# Patient Record
Sex: MALE | Race: WHITE | Employment: FULL TIME | URBAN - METROPOLITAN AREA
[De-identification: names, ages, dates, MRNs, and addresses within clinical notes are randomized per-mention and may not be internally consistent; named-entity substitution may affect disease eponyms.]

---

## 2022-08-13 ENCOUNTER — APPOINTMENT (OUTPATIENT)
Dept: GENERAL RADIOLOGY | Age: 27
End: 2022-08-13
Payer: COMMERCIAL

## 2022-08-13 ENCOUNTER — HOSPITAL ENCOUNTER (EMERGENCY)
Age: 27
Discharge: HOME | End: 2022-08-13
Attending: EMERGENCY MEDICINE
Payer: COMMERCIAL

## 2022-08-13 VITALS
BODY MASS INDEX: 22.63 KG/M2 | SYSTOLIC BLOOD PRESSURE: 127 MMHG | HEART RATE: 97 BPM | HEIGHT: 75 IN | RESPIRATION RATE: 16 BRPM | OXYGEN SATURATION: 98 % | DIASTOLIC BLOOD PRESSURE: 78 MMHG | WEIGHT: 182 LBS | TEMPERATURE: 99.6 F

## 2022-08-13 DIAGNOSIS — S82.832A OTHER CLOSED FRACTURE OF DISTAL END OF LEFT FIBULA, INITIAL ENCOUNTER: Primary | ICD-10-CM

## 2022-08-13 PROCEDURE — 29515 APPLICATION SHORT LEG SPLINT: CPT

## 2022-08-13 PROCEDURE — 73610 X-RAY EXAM OF ANKLE: CPT

## 2022-08-13 PROCEDURE — 6370000000 HC RX 637 (ALT 250 FOR IP): Performed by: EMERGENCY MEDICINE

## 2022-08-13 PROCEDURE — 99283 EMERGENCY DEPT VISIT LOW MDM: CPT

## 2022-08-13 RX ORDER — LAMOTRIGINE 25 MG/1
25 TABLET ORAL DAILY
COMMUNITY

## 2022-08-13 RX ORDER — HYDROCODONE BITARTRATE AND ACETAMINOPHEN 5; 325 MG/1; MG/1
1 TABLET ORAL EVERY 4 HOURS PRN
Qty: 18 TABLET | Refills: 0 | Status: SHIPPED | OUTPATIENT
Start: 2022-08-13 | End: 2022-08-16

## 2022-08-13 RX ORDER — ESOMEPRAZOLE MAGNESIUM 40 MG/1
40 CAPSULE, DELAYED RELEASE ORAL
COMMUNITY
Start: 2022-07-19 | End: 2023-07-14

## 2022-08-13 RX ORDER — OXYCODONE HYDROCHLORIDE AND ACETAMINOPHEN 5; 325 MG/1; MG/1
1 TABLET ORAL ONCE
Status: COMPLETED | OUTPATIENT
Start: 2022-08-13 | End: 2022-08-13

## 2022-08-13 RX ORDER — BUPROPION HYDROCHLORIDE 150 MG/1
150 TABLET ORAL
COMMUNITY
Start: 2022-07-19 | End: 2022-08-18

## 2022-08-13 RX ORDER — FLUTICASONE PROPIONATE AND SALMETEROL 100; 50 UG/1; UG/1
1 POWDER RESPIRATORY (INHALATION) 2 TIMES DAILY PRN
COMMUNITY
Start: 2022-01-19

## 2022-08-13 RX ADMIN — OXYCODONE AND ACETAMINOPHEN 1 TABLET: 5; 325 TABLET ORAL at 21:35

## 2022-08-13 ASSESSMENT — PAIN SCALES - GENERAL
PAINLEVEL_OUTOF10: 2
PAINLEVEL_OUTOF10: 5
PAINLEVEL_OUTOF10: 4

## 2022-08-13 ASSESSMENT — PAIN DESCRIPTION - ORIENTATION
ORIENTATION: LEFT

## 2022-08-13 ASSESSMENT — PAIN DESCRIPTION - FREQUENCY
FREQUENCY: CONTINUOUS
FREQUENCY: CONTINUOUS

## 2022-08-13 ASSESSMENT — PAIN - FUNCTIONAL ASSESSMENT
PAIN_FUNCTIONAL_ASSESSMENT: PREVENTS OR INTERFERES SOME ACTIVE ACTIVITIES AND ADLS
PAIN_FUNCTIONAL_ASSESSMENT: 0-10
PAIN_FUNCTIONAL_ASSESSMENT: 0-10

## 2022-08-13 ASSESSMENT — PAIN DESCRIPTION - PAIN TYPE
TYPE: ACUTE PAIN
TYPE: ACUTE PAIN

## 2022-08-13 ASSESSMENT — PAIN DESCRIPTION - DESCRIPTORS
DESCRIPTORS: ACHING
DESCRIPTORS: ACHING

## 2022-08-13 ASSESSMENT — PAIN DESCRIPTION - LOCATION
LOCATION: ANKLE

## 2022-08-13 ASSESSMENT — PAIN DESCRIPTION - ONSET: ONSET: ON-GOING

## 2022-08-13 NOTE — Clinical Note
Neto Saha was seen and treated in our emergency department on 8/13/2022. He may return to work on 08/18/2022. Restrictions as directed by the orthopedic doctor     If you have any questions or concerns, please don't hesitate to call.       Jinny Manuel, DO
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
## 2022-08-14 NOTE — DISCHARGE INSTRUCTIONS
Do not place any weight on this leg. You must use a walker or crutches to ambulate at all times. Continue this therapy until further instructions from your healthcare provider with whom you follow up. You have been administered medications in the emergency department that may cause drowsiness. Do not be driving, operating heavy machinery, swimming, caring for small children, or engaging in dangerous activities of any type until these medications have worn off. This may be as long as 6-8 hours. You have been prescribed medications that may cause drowsiness. Do not be driving, operating heavy machinery, swimming, caring for small children, or engaging in dangerous activities of any type until these medications have worn off. This may be as long as 6-8 hours. Do not take Tylenol (acetaminophen) with these medications. Tylenol is a component of this medication and this may cause an overdose of acetaminophen. Do not take Motrin (ibuprofen) or Naprosyn (naproxen) for pain because this will slow the healing of broken bones.
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
## 2022-08-15 ENCOUNTER — TELEPHONE (OUTPATIENT)
Dept: ORTHOPEDIC SURGERY | Age: 27
End: 2022-08-15

## 2022-08-15 NOTE — TELEPHONE ENCOUNTER
LVM for patient to al back if interested in scheduling an appointment with Dr Kay Kat.  Please offer Wednesday 8/17 @ 1 PM.

## 2022-08-16 ENCOUNTER — OFFICE VISIT (OUTPATIENT)
Dept: ORTHOPEDIC SURGERY | Age: 27
End: 2022-08-16
Payer: COMMERCIAL

## 2022-08-16 VITALS — RESPIRATION RATE: 18 BRPM | BODY MASS INDEX: 22.63 KG/M2 | WEIGHT: 182 LBS | HEIGHT: 75 IN

## 2022-08-16 DIAGNOSIS — S82.62XA CLOSED DISPLACED FRACTURE OF LATERAL MALLEOLUS OF LEFT FIBULA, INITIAL ENCOUNTER: ICD-10-CM

## 2022-08-16 DIAGNOSIS — S99.912A LEFT ANKLE INJURY, INITIAL ENCOUNTER: Primary | ICD-10-CM

## 2022-08-16 PROCEDURE — 99203 OFFICE O/P NEW LOW 30 MIN: CPT | Performed by: PHYSICIAN ASSISTANT

## 2022-08-16 PROCEDURE — L4361 PNEUMA/VAC WALK BOOT PRE OTS: HCPCS | Performed by: PHYSICIAN ASSISTANT
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
## 2022-08-17 ENCOUNTER — TELEPHONE (OUTPATIENT)
Dept: ORTHOPEDIC SURGERY | Age: 27
End: 2022-08-17

## 2022-08-17 NOTE — PROGRESS NOTES
Name_______________________________________Printed:____________________  Date and time of surgery_______8/18/22 0800_________________Arrival Time:_____0630 [facility]___________   1. The instructions given regarding when and if a patient needs to stop oral intake prior to surgery varies. Follow the specific instructions you were given                  _x__Nothing to eat or to drink after Midnight the night before.                   ____Carbo loading or ERAS instructions will be given to select patients-if you have been given those instructions -please do the following                           The evening before your surgery after dinner before midnight drink 40 ounces of gatorade. If you are diabetic use sugar free. The morning of surgery drink 40 ounces of water. This needs to be finished 3 hours prior to your surgery start time. 2. Take the following pills with a small sip of water on the morning of surgery___________advair________________________________________                  Do not take blood pressure medications ending in pril or sartan the morro prior to surgery or the morning of surgery_   3. Aspirin, Ibuprofen, Advil, Naproxen, Vitamin E and other Anti-inflammatory products and supplements should be stopped for 5 -7days before surgery or as directed by your physician. 4. Check with your Doctor regarding stopping Plavix, Coumadin,Eliquis, Lovenox,Effient,Pradaxa,Xarelto, Fragmin or other blood thinners and follow their instructions. 5. Do not smoke, and do not drink any alcoholic beverages 24 hours prior to surgery. This includes NA Beer. Refrain from the usage of any recreational drugs. 6. You may brush your teeth and gargle the morning of surgery. DO NOT SWALLOW WATER   7. You MUST make arrangements for a responsible adult to stay on site while you are here and take you home after your surgery. You will not be allowed to leave alone or drive yourself home.   It is strongly suggested someone stay with you the first 24 hrs. Your surgery will be cancelled if you do not have a ride home. 8. A parent/legal guardian must accompany a child scheduled for surgery and plan to stay at the hospital until the child is discharged. Please do not bring other children with you. 9. Please wear simple, loose fitting clothing to the hospital.  Wesley Posey not bring valuables (money, credit cards, checkbooks, etc.) Do not wear any makeup (including no eye makeup) or nail polish on your fingers or toes. 10. DO NOT wear any jewelry or piercings on day of surgery. All body piercing jewelry must be removed. 11. If you have ___dentures, they will be removed before going to the OR; we will provide you a container. If you wear ___contact lenses or ___glasses, they will be removed; please bring a case for them. 12. Please see your family doctor/pediatrician for a history & physical and/or concerning medications. Bring any test results/reports from your physician's office. PCP_________x_________Phone___________H&P Appt. Date________             13 If you  have a Living Will and Durable Power of  for Healthcare, please bring in a copy. 15. Notify your Surgeon if you develop any illness between now and surgery  time, cough, cold, fever, sore throat, nausea, vomiting, etc.  Please notify your surgeon if you experience dizziness, shortness of breath or blurred vision between now & the time of your surgery             15. DO NOT shave your operative site 96 hours prior to surgery. For face & neck surgery, men may use an electric razor 48 hours prior to surgery. 16. Shower the night before or morning of surgery using an antibacterial soap or as you have been instructed. 17. To provide excellent care visitors will be limited to one in the room at any given time. 18.  Please bring picture ID and insurance card.              19.  Visit our web site for additional information:  [email or web address]              20.During flu season no children under the age of 15 are permitted in the hospital for the safety of all patients. 21. If you take a long acting insulin in the evening only  take half of your usual  dose the night  before your procedure              22. If you use a c-pap please bring DOS if staying overnight,             23.For your convenience [address] [place] [address] has a pharmacy on site to fill your prescriptions. 24. If you use oxygen and have a portable tank please bring it  with you the DOS             25. Bring a complete list of all your medications with name and dose include any supplements. 26. Other__________________________________________   *Please call pre admission testing if you any further questions   Saira Malone   [address]    [address]  [phone]   [address] [place] [address]       VISITOR POLICY(subject to change)    Current policy is 2 visitors per patient. No children. A mask is required. Visiting hours are 8a-8p. Overnight visitors will be at the discretion of the nurse. All above information reviewed with patient in person or by phone. Patient verbalizes understanding. All questions and concerns addressed.                                                                                                  Patient/Rep________pt____________                                                                                                                                    PRE OP INSTRUCTIONS

## 2022-08-17 NOTE — TELEPHONE ENCOUNTER
Auth: NPR  Date: 08/18/22 thru 11/17/22  Reference # [id number]  Spoke with: Online  Type of SX: Outpatient  Location: [facility]  CPT: 38244   DX: [phone]  SX area: Lt ankle  Insurance: [employer]

## 2022-08-18 ENCOUNTER — ANESTHESIA (OUTPATIENT)
Dept: OPERATING ROOM | Age: 27
End: 2022-08-18
Payer: COMMERCIAL

## 2022-08-18 ENCOUNTER — APPOINTMENT (OUTPATIENT)
Dept: GENERAL RADIOLOGY | Age: 27
End: 2022-08-18
Attending: ORTHOPAEDIC SURGERY
Payer: COMMERCIAL

## 2022-08-18 ENCOUNTER — ANESTHESIA EVENT (OUTPATIENT)
Dept: OPERATING ROOM | Age: 27
End: 2022-08-18
Payer: COMMERCIAL

## 2022-08-18 ENCOUNTER — HOSPITAL ENCOUNTER (OUTPATIENT)
Age: 27
Setting detail: OUTPATIENT SURGERY
Discharge: HOME | End: 2022-08-18
Attending: ORTHOPAEDIC SURGERY | Admitting: ORTHOPAEDIC SURGERY
Payer: COMMERCIAL

## 2022-08-18 VITALS
HEIGHT: 75 IN | OXYGEN SATURATION: 97 % | WEIGHT: 183 LBS | BODY MASS INDEX: 22.75 KG/M2 | RESPIRATION RATE: 16 BRPM | SYSTOLIC BLOOD PRESSURE: 146 MMHG | TEMPERATURE: 97.5 F | HEART RATE: 99 BPM | DIASTOLIC BLOOD PRESSURE: 103 MMHG

## 2022-08-18 DIAGNOSIS — S82.62XA CLOSED DISPLACED FRACTURE OF LATERAL MALLEOLUS OF LEFT FIBULA, INITIAL ENCOUNTER: Primary | ICD-10-CM

## 2022-08-18 PROCEDURE — 7100000010 HC PHASE II RECOVERY - FIRST 15 MIN: Performed by: ORTHOPAEDIC SURGERY

## 2022-08-18 PROCEDURE — 6360000002 HC RX W HCPCS: Performed by: NURSE ANESTHETIST, CERTIFIED REGISTERED

## 2022-08-18 PROCEDURE — 73620 X-RAY EXAM OF FOOT: CPT

## 2022-08-18 PROCEDURE — 6360000002 HC RX W HCPCS: Performed by: ORTHOPAEDIC SURGERY

## 2022-08-18 PROCEDURE — 2500000003 HC RX 250 WO HCPCS: Performed by: NURSE ANESTHETIST, CERTIFIED REGISTERED

## 2022-08-18 PROCEDURE — 7100000000 HC PACU RECOVERY - FIRST 15 MIN: Performed by: ORTHOPAEDIC SURGERY

## 2022-08-18 PROCEDURE — 3600000004 HC SURGERY LEVEL 4 BASE: Performed by: ORTHOPAEDIC SURGERY

## 2022-08-18 PROCEDURE — 2500000003 HC RX 250 WO HCPCS: Performed by: ORTHOPAEDIC SURGERY

## 2022-08-18 PROCEDURE — 6360000002 HC RX W HCPCS: Performed by: ANESTHESIOLOGY

## 2022-08-18 PROCEDURE — 2720000010 HC SURG SUPPLY STERILE: Performed by: ORTHOPAEDIC SURGERY

## 2022-08-18 PROCEDURE — 2709999900 HC NON-CHARGEABLE SUPPLY: Performed by: ORTHOPAEDIC SURGERY

## 2022-08-18 PROCEDURE — A4217 STERILE WATER/SALINE, 500 ML: HCPCS | Performed by: ORTHOPAEDIC SURGERY

## 2022-08-18 PROCEDURE — C1713 ANCHOR/SCREW BN/BN,TIS/BN: HCPCS | Performed by: ORTHOPAEDIC SURGERY

## 2022-08-18 PROCEDURE — 2580000003 HC RX 258: Performed by: ANESTHESIOLOGY

## 2022-08-18 PROCEDURE — 3700000001 HC ADD 15 MINUTES (ANESTHESIA): Performed by: ORTHOPAEDIC SURGERY

## 2022-08-18 PROCEDURE — 3600000014 HC SURGERY LEVEL 4 ADDTL 15MIN: Performed by: ORTHOPAEDIC SURGERY

## 2022-08-18 PROCEDURE — 27792 TREATMENT OF ANKLE FRACTURE: CPT | Performed by: ORTHOPAEDIC SURGERY

## 2022-08-18 PROCEDURE — 7100000011 HC PHASE II RECOVERY - ADDTL 15 MIN: Performed by: ORTHOPAEDIC SURGERY

## 2022-08-18 PROCEDURE — 6370000000 HC RX 637 (ALT 250 FOR IP)

## 2022-08-18 PROCEDURE — 7100000001 HC PACU RECOVERY - ADDTL 15 MIN: Performed by: ORTHOPAEDIC SURGERY

## 2022-08-18 PROCEDURE — 3700000000 HC ANESTHESIA ATTENDED CARE: Performed by: ORTHOPAEDIC SURGERY

## 2022-08-18 PROCEDURE — 2580000003 HC RX 258: Performed by: ORTHOPAEDIC SURGERY

## 2022-08-18 DEVICE — SCREW BNE L16MM DIA2.7MM HEX HD DIA2.5MM CANC BIODUR 108C: Type: IMPLANTABLE DEVICE | Site: ANKLE | Status: FUNCTIONAL

## 2022-08-18 DEVICE — SCREW BNE L20MM DIA2.7MM HEX HD DIA2.5MM CANC BIODUR 108C: Type: IMPLANTABLE DEVICE | Site: ANKLE | Status: FUNCTIONAL

## 2022-08-18 DEVICE — SCREW BNE L22MM DIA2.7MM CORT ANK FT S STL ST NONCANNULATED: Type: IMPLANTABLE DEVICE | Site: ANKLE | Status: FUNCTIONAL

## 2022-08-18 DEVICE — SCREW BNE L14MM DIA2.7MM HEX HD DIA2.5MM CANC BIODUR 108C: Type: IMPLANTABLE DEVICE | Site: ANKLE | Status: FUNCTIONAL

## 2022-08-18 DEVICE — SCREW BNE L16MM DIA3.5MM HD DIA2.7MM PERIARTC CORT S STL ST: Type: IMPLANTABLE DEVICE | Site: ANKLE | Status: FUNCTIONAL

## 2022-08-18 DEVICE — PLATE BNE L80MM 4 H L LAT DST PERIARTC FIBULAR S STL LOK: Type: IMPLANTABLE DEVICE | Site: ANKLE | Status: FUNCTIONAL

## 2022-08-18 DEVICE — SCREW BNE L14MM DIA3.5MM HD DIA2.7MM CORT PERIARTC S STL ST: Type: IMPLANTABLE DEVICE | Site: ANKLE | Status: FUNCTIONAL

## 2022-08-18 RX ORDER — BUPIVACAINE HYDROCHLORIDE 5 MG/ML
INJECTION, SOLUTION EPIDURAL; INTRACAUDAL
Status: COMPLETED | OUTPATIENT
Start: 2022-08-18 | End: 2022-08-18

## 2022-08-18 RX ORDER — CEPHALEXIN 500 MG/1
500 CAPSULE ORAL 4 TIMES DAILY
Qty: 20 CAPSULE | Refills: 0 | Status: SHIPPED | OUTPATIENT
Start: 2022-08-18 | End: 2022-08-23

## 2022-08-18 RX ORDER — SODIUM CHLORIDE 0.9 % (FLUSH) 0.9 %
5-40 SYRINGE (ML) INJECTION EVERY 12 HOURS SCHEDULED
Status: DISCONTINUED | OUTPATIENT
Start: 2022-08-18 | End: 2022-08-18 | Stop reason: HOSPADM

## 2022-08-18 RX ORDER — SODIUM CHLORIDE 9 MG/ML
INJECTION, SOLUTION INTRAVENOUS PRN
Status: DISCONTINUED | OUTPATIENT
Start: 2022-08-18 | End: 2022-08-18 | Stop reason: HOSPADM

## 2022-08-18 RX ORDER — MIDAZOLAM HYDROCHLORIDE 1 MG/ML
INJECTION INTRAMUSCULAR; INTRAVENOUS PRN
Status: DISCONTINUED | OUTPATIENT
Start: 2022-08-18 | End: 2022-08-18 | Stop reason: SDUPTHER

## 2022-08-18 RX ORDER — HYDROCODONE BITARTRATE AND ACETAMINOPHEN 5; 325 MG/1; MG/1
1 TABLET ORAL ONCE
Status: COMPLETED | OUTPATIENT
Start: 2022-08-18 | End: 2022-08-18

## 2022-08-18 RX ORDER — HYDROMORPHONE HCL 110MG/55ML
0.5 PATIENT CONTROLLED ANALGESIA SYRINGE INTRAVENOUS EVERY 5 MIN PRN
Status: COMPLETED | OUTPATIENT
Start: 2022-08-18 | End: 2022-08-18

## 2022-08-18 RX ORDER — ONDANSETRON 2 MG/ML
4 INJECTION INTRAMUSCULAR; INTRAVENOUS
Status: DISCONTINUED | OUTPATIENT
Start: 2022-08-18 | End: 2022-08-18 | Stop reason: HOSPADM

## 2022-08-18 RX ORDER — SODIUM CHLORIDE 0.9 % (FLUSH) 0.9 %
5-40 SYRINGE (ML) INJECTION PRN
Status: DISCONTINUED | OUTPATIENT
Start: 2022-08-18 | End: 2022-08-18 | Stop reason: HOSPADM

## 2022-08-18 RX ORDER — MEPERIDINE HYDROCHLORIDE 25 MG/ML
12.5 INJECTION INTRAMUSCULAR; INTRAVENOUS; SUBCUTANEOUS EVERY 5 MIN PRN
Status: DISCONTINUED | OUTPATIENT
Start: 2022-08-18 | End: 2022-08-18 | Stop reason: HOSPADM

## 2022-08-18 RX ORDER — ONDANSETRON 2 MG/ML
INJECTION INTRAMUSCULAR; INTRAVENOUS PRN
Status: DISCONTINUED | OUTPATIENT
Start: 2022-08-18 | End: 2022-08-18 | Stop reason: SDUPTHER

## 2022-08-18 RX ORDER — FENTANYL CITRATE 50 UG/ML
INJECTION, SOLUTION INTRAMUSCULAR; INTRAVENOUS PRN
Status: DISCONTINUED | OUTPATIENT
Start: 2022-08-18 | End: 2022-08-18 | Stop reason: SDUPTHER

## 2022-08-18 RX ORDER — HYDROCODONE BITARTRATE AND ACETAMINOPHEN 5; 325 MG/1; MG/1
1 TABLET ORAL EVERY 6 HOURS PRN
Qty: 12 TABLET | Refills: 0 | Status: SHIPPED | OUTPATIENT
Start: 2022-08-18 | End: 2022-08-21

## 2022-08-18 RX ORDER — DEXAMETHASONE SODIUM PHOSPHATE 4 MG/ML
INJECTION, SOLUTION INTRA-ARTICULAR; INTRALESIONAL; INTRAMUSCULAR; INTRAVENOUS; SOFT TISSUE PRN
Status: DISCONTINUED | OUTPATIENT
Start: 2022-08-18 | End: 2022-08-18 | Stop reason: SDUPTHER

## 2022-08-18 RX ORDER — LIDOCAINE HYDROCHLORIDE 20 MG/ML
INJECTION, SOLUTION INFILTRATION; PERINEURAL PRN
Status: DISCONTINUED | OUTPATIENT
Start: 2022-08-18 | End: 2022-08-18 | Stop reason: SDUPTHER

## 2022-08-18 RX ORDER — SODIUM CHLORIDE 9 MG/ML
INJECTION, SOLUTION INTRAVENOUS CONTINUOUS
Status: DISCONTINUED | OUTPATIENT
Start: 2022-08-18 | End: 2022-08-18 | Stop reason: HOSPADM

## 2022-08-18 RX ORDER — HYDROCODONE BITARTRATE AND ACETAMINOPHEN 5; 325 MG/1; MG/1
TABLET ORAL
Status: COMPLETED
Start: 2022-08-18 | End: 2022-08-18

## 2022-08-18 RX ORDER — PROPOFOL 10 MG/ML
INJECTION, EMULSION INTRAVENOUS PRN
Status: DISCONTINUED | OUTPATIENT
Start: 2022-08-18 | End: 2022-08-18 | Stop reason: SDUPTHER

## 2022-08-18 RX ADMIN — SODIUM CHLORIDE: 9 INJECTION, SOLUTION INTRAVENOUS at 07:26

## 2022-08-18 RX ADMIN — FENTANYL CITRATE 50 MCG: 50 INJECTION, SOLUTION INTRAMUSCULAR; INTRAVENOUS at 09:15

## 2022-08-18 RX ADMIN — CEFAZOLIN 2000 MG: 2 INJECTION, POWDER, FOR SOLUTION INTRAMUSCULAR; INTRAVENOUS at 09:10

## 2022-08-18 RX ADMIN — HYDROMORPHONE HYDROCHLORIDE 0.5 MG: 2 INJECTION, SOLUTION INTRAMUSCULAR; INTRAVENOUS; SUBCUTANEOUS at 10:24

## 2022-08-18 RX ADMIN — PROPOFOL 100 MG: 10 INJECTION, EMULSION INTRAVENOUS at 09:33

## 2022-08-18 RX ADMIN — HYDROMORPHONE HYDROCHLORIDE 0.5 MG: 2 INJECTION, SOLUTION INTRAMUSCULAR; INTRAVENOUS; SUBCUTANEOUS at 10:34

## 2022-08-18 RX ADMIN — HYDROCODONE BITARTRATE AND ACETAMINOPHEN 1 TABLET: 5; 325 TABLET ORAL at 10:50

## 2022-08-18 RX ADMIN — LIDOCAINE HYDROCHLORIDE 100 MG: 20 INJECTION, SOLUTION INFILTRATION; PERINEURAL at 09:18

## 2022-08-18 RX ADMIN — PROPOFOL 100 MG: 10 INJECTION, EMULSION INTRAVENOUS at 09:39

## 2022-08-18 RX ADMIN — MIDAZOLAM 2 MG: 1 INJECTION INTRAMUSCULAR; INTRAVENOUS at 09:12

## 2022-08-18 RX ADMIN — PROPOFOL 200 MG: 10 INJECTION, EMULSION INTRAVENOUS at 09:26

## 2022-08-18 RX ADMIN — ONDANSETRON 4 MG: 2 INJECTION INTRAMUSCULAR; INTRAVENOUS at 09:34

## 2022-08-18 RX ADMIN — DEXAMETHASONE SODIUM PHOSPHATE 10 MG: 4 INJECTION, SOLUTION INTRAMUSCULAR; INTRAVENOUS at 09:34

## 2022-08-18 RX ADMIN — FENTANYL CITRATE 50 MCG: 50 INJECTION, SOLUTION INTRAMUSCULAR; INTRAVENOUS at 09:22

## 2022-08-18 RX ADMIN — HYDROMORPHONE HYDROCHLORIDE 0.5 MG: 2 INJECTION, SOLUTION INTRAMUSCULAR; INTRAVENOUS; SUBCUTANEOUS at 10:39

## 2022-08-18 RX ADMIN — HYDROMORPHONE HYDROCHLORIDE 0.5 MG: 2 INJECTION, SOLUTION INTRAMUSCULAR; INTRAVENOUS; SUBCUTANEOUS at 10:29

## 2022-08-18 RX ADMIN — PROPOFOL 200 MG: 10 INJECTION, EMULSION INTRAVENOUS at 09:19

## 2022-08-18 ASSESSMENT — PAIN SCALES - GENERAL
PAINLEVEL_OUTOF10: 7
PAINLEVEL_OUTOF10: 4
PAINLEVEL_OUTOF10: 7
PAINLEVEL_OUTOF10: 4

## 2022-08-18 ASSESSMENT — PAIN DESCRIPTION - FREQUENCY
FREQUENCY: CONTINUOUS

## 2022-08-18 ASSESSMENT — PAIN DESCRIPTION - DESCRIPTORS
DESCRIPTORS: ACHING

## 2022-08-18 ASSESSMENT — PAIN DESCRIPTION - LOCATION
LOCATION: ANKLE

## 2022-08-18 ASSESSMENT — PAIN DESCRIPTION - ORIENTATION
ORIENTATION: LEFT

## 2022-08-18 ASSESSMENT — PAIN DESCRIPTION - PAIN TYPE
TYPE: SURGICAL PAIN

## 2022-08-18 ASSESSMENT — PAIN - FUNCTIONAL ASSESSMENT
PAIN_FUNCTIONAL_ASSESSMENT: PREVENTS OR INTERFERES SOME ACTIVE ACTIVITIES AND ADLS
PAIN_FUNCTIONAL_ASSESSMENT: 0-10

## 2022-08-18 ASSESSMENT — PAIN DESCRIPTION - ONSET
ONSET: ON-GOING

## 2022-08-18 NOTE — DISCHARGE INSTRUCTIONS
Post op instruction:  1- D/C home  2- Dx Left ankle lateral malleolus fracture. 3- NWB left ankle  4- Elevation surgical site, with ice  5- Keep splint dry and clean  6- F/U in 2 weeks. 7- For DVT prophylaxis- Aspirin 325 mg daily     Hermelinda Rios MD, 8/18/2022      ORTHOPEDIC/PODIATRY DISCHARGE INSTRUCTIONS    Follow your surgeons instructions. Make follow-up appointment. Observe operative area for signs of excessive bleeding such as a slow general ooze that saturates the dressing or bright red bleeding. In either case, apply pressure to the area and elevate if possible and call your surgeon right away. Observe the affected extremity for circulation or nerve impairment such as a change in color, numbness, tingling, coldness or increased pain. If any of these symptoms are present call your surgeon. Observe operative site for any signs of infection such as increased pain, redness, fever greater than 101 degrees, swelling, foul odor or drainage. Contact surgeon if any of these symptoms are present. If you become short of breath call your surgeon or go to the nearest emergency room. Remove dressing if directed by surgeon. Leave steristips or sutures or staples in place. You may loosen your ace wrap if it feels too tight, or if you have severe pain, or if it has swelling. Elevate extremity as directed by surgeon. You may shower when directed by surgeon. Use ice pack as directed by surgeon. Do not use heat. Avoid stress to suture line such as pulling, pushing or tugging. Use crutches as directed by surgeon  Take medications as ordered. Take pain medication with food. Do not drive or operate machinery while taking narcotics. Call your surgeon for any questions or problems. ANESTHESIA DISCHARGE INSTRUCTIONS    Wear your seatbelt home. You are under the influence of drugs-do not drink alcohol, drive, operate machinery, make any important decisions or sign any legal documents for 24 hours.   Children should not ride bikes, [place] or play on gym sets for 24 hours after surgery. A responsible adult needs to be with you for 24 hours. You may experience lightheadedness, dizziness, or sleepiness following surgery. Rest at home today- increase activity as tolerated. Progress slowly to a regular diet unless your physician has instructed you otherwise. Drink plenty of water. If persistent nausea and vomiting becomes a problem, call your physician. Coughing, sore throat and muscle aches are other side effects of anesthesia, and should improve with time. Do not drive or operate machinery while taking narcotics.

## 2022-08-18 NOTE — PROGRESS NOTES
Pt awake and alert at this time. Pt on RA, and VSS. Pt denies nausea and pain tolerable, tolerating PO. Skin warm ,  and able to wiggle left toes. Pt meets criteria to be discharged from Phase 1.

## 2022-08-18 NOTE — PROGRESS NOTES
Discharge instructions review with patient and Elissa Epperson. All home medications have been reviewed, pt v/u. Discharge instructions signed. Pt discharged via wheelchair. Pt discharged with all belongings. Pt states he has crutches at home. Ines Glover taking stable pt home.

## 2022-08-18 NOTE — PROGRESS NOTES
Pt arrived from OR to PACU bay 8. Reported received from [address] [place] [address] staff. Pt arousable to voice. Surgical incisions dressings in place to L Ankle. Pt on 6L simple mask with Nasal trumpet, Sinue Tach, and VSS. Will continue to monitor.

## 2022-08-18 NOTE — ANESTHESIA POSTPROCEDURE EVALUATION
Department of Anesthesiology  Postprocedure Note    Patient: Claudio Cooney  MRN: [id number]  YOB: 1995  Date of evaluation: 8/18/2022      Procedure Summary     Date: 08/18/22 Room / Location: [address]    Anesthesia Start: 0915 Anesthesia Stop: 1010    Procedure: OPEN REDUCTION INTERNAL FIXATION LEFT ANKLE DISTAL FIBULA FRACTURE - Fredrica Cheese (Left: Ankle) Diagnosis:       Closed fracture of left ankle, initial encounter      (K15.651U  LEFT DISTAL FIBULA FRACTURE ANKLE FRACTURE)    Surgeons: Christine York MD Responsible Provider: Carmen Alvarado MD    Anesthesia Type: general ASA Status: 2          Anesthesia Type: No value filed.     Jazmin Phase I: Jazmin Score: 10    Jazmin Phase II:        Anesthesia Post Evaluation    Patient location during evaluation: PACU  Patient participation: complete - patient participated  Level of consciousness: awake  Airway patency: patent  Nausea & Vomiting: no vomiting  Complications: no  Cardiovascular status: hemodynamically stable  Respiratory status: acceptable  Hydration status: euvolemic  Multimodal analgesia pain management approach

## 2022-08-18 NOTE — ANESTHESIA PRE PROCEDURE
Department of Anesthesiology  Preprocedure Note       Name:  Bev Casey   Age:  24 y.o.  :  1995                                          MRN:  [id number]         Date:  2022      Surgeon: Nai Loya):  Arthur Mondragon MD    Procedure: Procedure(s):  OPEN REDUCTION INTERNAL FIXATION LEFT ANKLE DISTAL FIBULA FRACTURE - Eddi Session    Medications prior to admission:   Prior to Admission medications    Medication Sig Start Date End Date Taking? Authorizing Provider   Lisdexamfetamine Dimesylate (VYVANSE) 20 MG CAPS Take 20 mg by mouth in the morning. Historical Provider, MD   lamoTRIgine (LAMICTAL) 25 MG tablet Take 25 mg by mouth in the morning. Takes 2 tablets daily. Historical Provider, MD   buPROPion (WELLBUTRIN XL) 150 MG extended release tablet Take 150 mg by mouth 22  Historical Provider, MD   esomeprazole (NEXIUM) 40 MG delayed release capsule Take 40 mg by mouth 22  Historical Provider, MD   fluticasone-salmeterol (ADVIAR) 100-50 MCG/ACT AEPB diskus inhaler Inhale 1 puff into the lungs 2 times daily as needed 22   Historical Provider, MD       Current medications:    No current facility-administered medications for this encounter. Allergies:  No Known Allergies    Problem List:    Patient Active Problem List   Diagnosis Code    Closed displaced fracture of lateral malleolus of left fibula S82. 57XA       Past Medical History:        Diagnosis Date    Anxiety     Bipolar 1 disorder ([facility] [address]        Past Surgical History:        Procedure Laterality Date    COLONOSCOPY      TONSILLECTOMY         Social History:    Social History     Tobacco Use    Smoking status: Former     Packs/day: 1.00     Types: Cigarettes    Smokeless tobacco: Never   Substance Use Topics    Alcohol use:  Yes     Alcohol/week: 42.0 standard drinks     Types: 42 Cans of beer per week     Comment: social                                Counseling given: Not Answered      Vital Signs (Current):   Vitals:    08/17/22 0918   Weight: 180 lb (81.6 kg)   Height: 6' 3\" (1.905 m)                                              BP Readings from Last 3 Encounters:   08/13/22 127/78       NPO Status:                                                                                 BMI:   Wt Readings from Last 3 Encounters:   08/17/22 180 lb (81.6 kg)   08/16/22 182 lb (82.6 kg)   08/13/22 182 lb (82.6 kg)     Body mass index is 22.5 kg/m². CBC: No results found for: WBC, RBC, HGB, HCT, MCV, RDW, PLT    CMP: No results found for: NA, K, CL, CO2, BUN, CREATININE, GFRAA, AGRATIO, LABGLOM, GLUCOSE, GLU, PROT, CALCIUM, BILITOT, ALKPHOS, AST, ALT    POC Tests: No results for input(s): POCGLU, POCNA, POCK, POCCL, POCBUN, POCHEMO, POCHCT in the last 72 hours. Coags: No results found for: PROTIME, INR, APTT    HCG (If Applicable): No results found for: PREGTESTUR, PREGSERUM, HCG, HCGQUANT     ABGs: No results found for: PHART, PO2ART, JLA2TCM, GQG5WAM, BEART, P6CIPKNB     Type & Screen (If Applicable):  No results found for: LABABO, LABRH    Drug/Infectious Status (If Applicable):  No results found for: HIV, HEPCAB    COVID-19 Screening (If Applicable): No results found for: COVID19        Anesthesia Evaluation  Patient summary reviewed and Nursing notes reviewed  Airway: Mallampati: II          Dental:          Pulmonary:   (+) asthma:                            Cardiovascular:  Exercise tolerance: good (>4 METS),                     Neuro/Psych:   (+) psychiatric history:            GI/Hepatic/Renal:             Endo/Other:                     Abdominal:             Vascular:           Other Findings:           Anesthesia Plan      general     ASA 2                                   Tony Germain MD   8/18/2022
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
## 2022-08-31 ENCOUNTER — OFFICE VISIT (OUTPATIENT)
Dept: ORTHOPEDIC SURGERY | Age: 27
End: 2022-08-31

## 2022-08-31 VITALS — WEIGHT: 183 LBS | HEIGHT: 75 IN | BODY MASS INDEX: 22.75 KG/M2

## 2022-08-31 DIAGNOSIS — S82.62XA CLOSED DISPLACED FRACTURE OF LATERAL MALLEOLUS OF LEFT FIBULA, INITIAL ENCOUNTER: Primary | ICD-10-CM

## 2022-08-31 PROCEDURE — APPNB15 APP NON BILLABLE TIME 0-15 MINS: Performed by: NURSE PRACTITIONER

## 2022-08-31 PROCEDURE — 99024 POSTOP FOLLOW-UP VISIT: CPT | Performed by: NURSE PRACTITIONER

## 2022-08-31 NOTE — PROGRESS NOTES
CHIEF COMPLAINT:  Charles Pozo is here today for Subsequent Annual Medicare Wellness Visit.  Increased stomach gas, urinating issues..    Medication verified and med list updated    Refills needed today?  Yes         CHOLESTEROL (mg/dL)   Date Value   06/26/2017 196     HDL (mg/dL)   Date Value   06/26/2017 43 (L)     No components found for: CHOLHDLRATIO  TRIGLYCERIDE (mg/dL)   Date Value   06/26/2017 100     CALCULATED LDL (mg/dL)   Date Value   06/26/2017 133 (H)      Glucose (mg/dL)   Date Value   06/26/2017 136 (H)     PSA, Total   Date Value   06/07/2016 3.27 ng/mL   07/27/2011 6.05 NG/ML (H)         Health Maintenance   Topic Date Due   • Zoster Vaccine  03/27/1999   • Pneumococcal Vaccine Adult (2 of 2 - PCV13) 09/01/2016   • Diabetes Eye Exam  06/03/2017   • Influenza Vaccine (Season Ended) 09/01/2017   • Diabetes A1C  12/26/2017   • Diabetes GFR  06/26/2018   • Diabetes Foot Exam  06/29/2018   • Medicare Wellness 65+  06/29/2018   • DTaP/Tdap/Td Vaccine (2 - Td) 06/23/2026      DIAGNOSIS:  Left ankle lateral malleolus displaced fracture, status post ORIF. DATE OF SURGERY: 8/18/2022. HISTORY OF PRESENT ILLNESS:  Mr. Sterling Goldberg 24 y.o.  male who came in today for 2 weeks postoperative visit. The patient denies any significant pain in the left ankle. Rates pain a 0-1/10 VAS mild, aching, intermittent and are improving. Aggravating factors movement. Alleviating factors elevation and rest. He has been in a splint, and non WB. No numbness or tingling sensation. No fever or Chills. He is a smoker. He works in construction. PHYSICAL EXAMINATION:  The incision healing well. No signs of any erythema or drainage, minimal swelling. He has no pain with the active or passive range of motion of the left ankle, but decrease ROM. He has intact sensation distally, and he is neurovascularly intact. IMAGING:  Three views left ankle taken today in the office showed anatomic alignment of the fracture, plate and screws in good position, no loosening. Ankle mortise is well centered. IMPRESSION:  2 weeks out from left ankle lateral malleolus fracture, ORIF and doing very well. PLAN: He placed in a boot, and can start gradual WB. Rest and elevate to help with swelling. I have told the patient to work on ROM. ASA for DVT prophylaxis. The patient will come back for a follow up in 6 weeks. At that time, we will take 3 views of the left ankle standing. The patient smokes cigarettes, and we discussed with the patient the risks of smoking on general health and also on bone and soft tissue healing (delay and non-union), and promised to cut down or stop smoking. Smoking: Educated the patient regarding the hazards of smoking and that it harms their body in many ways. It increases the chance of developing heart disease, lung disease, cancer, and other health problems including poor bone and wound healing.     The importance of smoking cessation for optimal bone and wound healing was stressed. This was communicated verbally, 5 Minutes.       GABRIEL Blandon - CNP

## 2022-08-31 NOTE — LETTER
[facility]  [address] [place] [address] [address] [address]  Phone: [phone]  Fax: [phone]    Yvette Tuttle MD        August 31, 2022     Patient: Silke Valdez   YOB: 1995   Date of Visit: 8/31/2022       To Whom It May Concern: It is my medical opinion that Gerry Abrams should remain out of work until 10/13/22. If you have any questions or concerns, please don't hesitate to call.     Sincerely,      Yvette Tuttle MD

## 2022-10-12 ENCOUNTER — OFFICE VISIT (OUTPATIENT)
Dept: ORTHOPEDIC SURGERY | Age: 27
End: 2022-10-12

## 2022-10-12 VITALS — HEIGHT: 75 IN | WEIGHT: 183 LBS | BODY MASS INDEX: 22.75 KG/M2

## 2022-10-12 DIAGNOSIS — S82.62XA CLOSED DISPLACED FRACTURE OF LATERAL MALLEOLUS OF LEFT FIBULA, INITIAL ENCOUNTER: Primary | ICD-10-CM

## 2022-10-12 PROCEDURE — APPNB15 APP NON BILLABLE TIME 0-15 MINS: Performed by: NURSE PRACTITIONER

## 2022-10-12 PROCEDURE — 99024 POSTOP FOLLOW-UP VISIT: CPT | Performed by: NURSE PRACTITIONER

## 2022-10-12 NOTE — LETTER
[facility]  [facility] [place] [address] [address]  Phone: [phone]  Fax: [phone]    Garcia Haro MD        October 12, 2022     Patient: Paula Jimenes   YOB: 1995   Date of Visit: 10/12/2022       To Whom It May Concern: It is my medical opinion that Miah Coy may return to work on 10/13/22 without restrictions. If you have any questions or concerns, please don't hesitate to call.     Sincerely,        Garcia Haro MD

## (undated) DEVICE — BIT DRL DIA2MM STD QUIK CONN FOR PERIARTC LOK PLT SYS

## (undated) DEVICE — BANDAGE,ELASTIC,ESMARK,STERILE,6"X9',LF: Brand: MEDLINE

## (undated) DEVICE — SYRINGE IRRIG 60ML SFT PLIABLE BLB EZ TO GRP 1 HND USE W/

## (undated) DEVICE — MASTISOL ADHESIVE LIQ 2/3ML

## (undated) DEVICE — ELECTRODE PT RET AD L9FT HI MOIST COND ADH HYDRGEL CORDED

## (undated) DEVICE — HYPODERMIC SAFETY NEEDLE: Brand: MAGELLAN

## (undated) DEVICE — MEDICINE CUP, GRADUATED, STER: Brand: MEDLINE

## (undated) DEVICE — PACK PROCEDURE SURG EXTREMITY MFFOP CUST

## (undated) DEVICE — BANDAGE COMPR W6INXL10YD ST M E WHITE/BEIGE

## (undated) DEVICE — TOWEL,OR,DSP,ST,BLUE,STD,4/PK,20PK/CS: Brand: MEDLINE

## (undated) DEVICE — GAUZE,SPONGE,4"X4",8PLY,STRL,LF,10/TRAY: Brand: MEDLINE

## (undated) DEVICE — MASC TURNOVER KIT: Brand: MEDLINE INDUSTRIES, INC.

## (undated) DEVICE — APPLICATOR MEDICATED 26 CC SOLUTION HI LT ORNG CHLORAPREP

## (undated) DEVICE — STRIP,CLOSURE,WOUND,MEDI-STRIP,1/2X4: Brand: MEDLINE

## (undated) DEVICE — BIT DRL L100MM DIA2.5MM FOR SM FRAG UNIV LOK SYS

## (undated) DEVICE — SUTURE MCRYL + SZ 4-0 L27IN ABSRB UD L19MM PS-2 3/8 CIR MCP426H

## (undated) DEVICE — GLOVE SURG SZ 65 THK91MIL LTX FREE SYN POLYISOPRENE

## (undated) DEVICE — SUTURE VCRL + SZ 3-0 L18IN ABSRB UD SH 1/2 CIR TAPERCUT NDL VCP864D

## (undated) DEVICE — SPONGE LAP W18XL18IN WHT COT 4 PLY FLD STRUNG RADPQ DISP ST

## (undated) DEVICE — C-ARM: Brand: UNBRANDED

## (undated) DEVICE — GLOVE SURG SZ 65 L12IN THK75MIL DK GRN LTX FREE

## (undated) DEVICE — DRESSING,GAUZE,XEROFORM,CURAD,1"X8",ST: Brand: CURAD

## (undated) DEVICE — DRAPE,U/ SHT,SPLIT,PLAS,STERIL: Brand: MEDLINE

## (undated) DEVICE — PADDING CAST W4INXL4YD ST COT RAYON MICROPLEATED HIGHLY

## (undated) DEVICE — T-DRAPE,EXTREMITY,STERILE: Brand: MEDLINE

## (undated) DEVICE — ZIMMER® STERILE DISPOSABLE TOURNIQUET CUFF WITH PLC, DUAL PORT, SINGLE BLADDER, 34 IN. (86 CM)

## (undated) DEVICE — INTENDED FOR TISSUE SEPARATION, AND OTHER PROCEDURES THAT REQUIRE A SHARP SURGICAL BLADE TO PUNCTURE OR CUT.: Brand: BARD-PARKER ® STAINLESS STEEL BLADES

## (undated) DEVICE — Z DISCONTINUED GLOVE SURG SZ 7 L12IN FNGR THK13MIL WHT ISOLEX POLYISOPRENE

## (undated) DEVICE — SOLUTION IRRIG 500ML 0.9% SOD CHL USP POUR PLAS BTL

## (undated) DEVICE — SUTURE VCRL + SZ 2-0 L18IN ABSRB UD CT1 L36MM 1/2 CIR VCP839D

## (undated) DEVICE — ZIMMER® STERILE DISPOSABLE TOURNIQUET CUFF WITH PLC, DUAL PORT, SINGLE BLADDER, 18 IN. (46 CM)

## (undated) DEVICE — BANDAGE COMPR W4INXL12FT E DISP ESMARCH EVEN

## (undated) DEVICE — GLOVE ORTHO 8   MSG9480

## (undated) DEVICE — GLOVE ORANGE PI 8   MSG9080